# Patient Record
Sex: MALE | Race: OTHER | Employment: FULL TIME | URBAN - METROPOLITAN AREA
[De-identification: names, ages, dates, MRNs, and addresses within clinical notes are randomized per-mention and may not be internally consistent; named-entity substitution may affect disease eponyms.]

---

## 2017-07-26 ENCOUNTER — OFFICE VISIT (OUTPATIENT)
Dept: FAMILY MEDICINE CLINIC | Age: 47
End: 2017-07-26

## 2017-07-26 VITALS
HEART RATE: 61 BPM | HEIGHT: 68 IN | TEMPERATURE: 97.7 F | DIASTOLIC BLOOD PRESSURE: 84 MMHG | RESPIRATION RATE: 20 BRPM | OXYGEN SATURATION: 96 % | WEIGHT: 253 LBS | BODY MASS INDEX: 38.34 KG/M2 | SYSTOLIC BLOOD PRESSURE: 130 MMHG

## 2017-07-26 DIAGNOSIS — M25.512 CHRONIC LEFT SHOULDER PAIN: Primary | ICD-10-CM

## 2017-07-26 DIAGNOSIS — G89.29 CHRONIC LEFT SHOULDER PAIN: Primary | ICD-10-CM

## 2017-07-26 RX ORDER — CYCLOBENZAPRINE HCL 5 MG
5 TABLET ORAL
Qty: 15 TAB | Refills: 0 | OUTPATIENT
Start: 2017-07-26 | End: 2022-05-21

## 2017-07-26 RX ORDER — IBUPROFEN 800 MG/1
800 TABLET ORAL
Qty: 30 TAB | Refills: 0 | Status: SHIPPED | OUTPATIENT
Start: 2017-07-26

## 2017-07-26 NOTE — PATIENT INSTRUCTIONS
MD Mitch Quevedo MD  [address] [place] [address]  For a physician appointment, please call [phone]        Cheri Cerda.  Niyah Mcmanus M.D.  Mami Hebert, [address]  Phone: [phone]  Fax: [phone]

## 2017-07-26 NOTE — PROGRESS NOTES
Subjective:      Paola Christopher is a 41 y.o. male who presents for left shoulder pain. Patient has been seen in the past for this. He has not had physical therapy. He denies any falls to the area. Reviewed imaging from October 2016, imaging did not show any fracture, dislocation,   Patient reports that he picked up a car battery yesterday, felt a strain in his posterior shoulder. Since then his pain has been exacerbated. His shoulder was doing well prior to this. Has been taking some Excedrin he has at home without much improvement. Denies any fevers or chills. Denies any decreased range of motion. Denies any weakness or loss of sensation. ROS:  General/Constitutional:   No headache, fever, fatigue, weight loss or weight gain       Cardiac:    No chest pain      Respiratory:   No cough or shortness of breath     Neurological:   No loss of consciousness, dizziness, seizures,    Skin: No rash   MSK: As above     PMHx:  Past Medical History:   Diagnosis Date    Contact dermatitis and other eczema, due to unspecified cause     Headache        Meds:   Current Outpatient Prescriptions   Medication Sig Dispense Refill    ibuprofen (MOTRIN) 800 mg tablet Take 1 Tab by mouth every eight (8) hours as needed for Pain. 30 Tab 0    cyclobenzaprine (FLEXERIL) 5 mg tablet Take 1 Tab by mouth three (3) times daily as needed for Muscle Spasm(s). 15 Tab 0       Allergies:   No Known Allergies    Smoker:  History   Smoking Status    Never Smoker   Smokeless Tobacco    Never Used       ETOH:   History   Alcohol Use No       FH:   Family History   Problem Relation Age of Onset    Heart Disease Father     Hypertension Father          Objective:     Visit Vitals    /84    Pulse 61    Temp 97.7 °F (36.5 °C) (Oral)    Resp 20    Ht 5' 8\" (1.727 m)    Wt 253 lb (114.8 kg)    SpO2 96%    BMI 38.47 kg/m2       Physical Examination:   GEN: Well appearing. No apparent distress.  Responds to all questions appropriately. Lungs: No labored respirations. Talking in complete sentences without difficulty. Shoulder: left  Deformity: None     ROM:  Forward Flexion: Active: 180                        Passive: 180  ER (0): Active: 45                  Passive: 45  Abduction: Active: 180                        Passive: 180     Palpation:  AC tenderness: None  SC tenderness: None  Clavicle tenderness: None  Biceps tenderness: None     Strength (0-5/5):  Deltoid - Anterior: 5/5  Deltoid - Posterior: 5/5  Deltoid - Mid: 5/5  Supraspinatus: 5/5  External rotation: 5/5  Internal rotation: 5/5        Neuro/Vascular:  Pulses intact, no edema, and neurologically intact      Assessment:       ICD-10-CM ICD-9-CM    1. Chronic left shoulder pain M25.512 719.41 ibuprofen (MOTRIN) 800 mg tablet    G89.29 338.29 cyclobenzaprine (FLEXERIL) 5 mg tablet      REFERRAL TO ORTHOPEDIC SURGERY      REFERRAL TO PHYSICAL THERAPY           Plan:       - Start Motrin 800 mg TID as needed and Flexeril 5mg prn   - Apply ice/heat to area  - Provided list of shoulder exercises   - Referral to Physical Therapy   - Refer to Ortho surgery per patient requests   - Follow up in 2 weeks for annual physical   - Patient is in agreement with the plan       Patient is counseled to return to the office if symptoms do not improve as expected. Urgent consultation with the nearest Emergency Department is strongly recommended if condition worsens. Patient is counseled to follow up as recommended and to inform the office if any changes in treatment are recommended.       Patient discussed  with Dr. Cass Correa        Signed By:  Karen Amanda MD    Family Medicine Resident

## 2017-07-26 NOTE — PROGRESS NOTES
Visit Vitals    /84    Pulse 61    Temp 97.7 °F (36.5 °C) (Oral)    Resp 20    Ht 5' 8\" (1.727 m)    Wt 253 lb (114.8 kg)    SpO2 96%    BMI 38.47 kg/m2     Chief Complaint   Patient presents with    Shoulder Pain     does heavy lifting at work, had pains in left shoulder and back for 2 days, took over the otc helped for a short time and pain came back, feels like  bones is shifting     Back Pain

## 2017-07-26 NOTE — MR AVS SNAPSHOT
Visit Information Date & Time Provider Department Dept. Phone Encounter #  
 7/26/2017 10:10 AM Mery Katz MD [facility] [phone] [id number] Follow-up Instructions Return in about 2 weeks (around 8/9/2017) for annual physical . Your Appointments 8/25/2017  1:00 PM  
COMPLETE PHYSICAL with Mery Katz MD  
[facility]) Appt Note: CPE  
 [facility] [facility]  
[phone]  
  
   
 [address] [place] [address] [facility] [phone] Upcoming Health Maintenance Date Due INFLUENZA AGE 9 TO ADULT 8/1/2017 DTaP/Tdap/Td series (2 - Td) 1/12/2025 Allergies as of 7/26/2017  Review Complete On: 7/26/2017 By: Scot Alves LPN No Known Allergies Current Immunizations  Never Reviewed Name Date Influenza Vaccine 9/26/2013 Influenza Vaccine (Quad) PF 1/4/2016 Influenza Vaccine PF 1/12/2015 Tdap 1/12/2015 Not reviewed this visit You Were Diagnosed With   
  
 Codes Comments Chronic left shoulder pain    -  Primary ICD-10-CM: M25.512, N78.85 ICD-9-CM: 719.41, 338.29 Vitals BP Pulse Temp Resp Height(growth percentile) Weight(growth percentile) 130/84 61 97.7 °F (36.5 °C) (Oral) 20 5' 8\" (1.727 m) 253 lb (114.8 kg) SpO2 BMI Smoking Status 96% 38.47 kg/m2 Never Smoker Vitals History BMI and BSA Data Body Mass Index Body Surface Area  
 38.47 kg/m 2 2.35 m 2 Preferred Pharmacy Pharmacy Name Phone [facility] [address] [place] [address] [phone] Your Updated Medication List  
  
   
This list is accurate as of: 7/26/17 10:13 AM.  Always use your most recent med list.  
  
  
  
  
 citalopram 20 mg tablet Commonly known as:  CELEXA  
TAKE 1 TABLET BY MOUTH DAILY. cyclobenzaprine 5 mg tablet Commonly known as:  FLEXERIL  
 Take 1 Tab by mouth three (3) times daily as needed for Muscle Spasm(s). hydrocortisone 2.5 % rectal cream  
Commonly known as:  ANUSOL-HC Insert  into rectum four (4) times daily as needed for Hemorrhoids. ibuprofen 800 mg tablet Commonly known as:  MOTRIN Take 1 Tab by mouth every eight (8) hours as needed for Pain. predniSONE 20 mg tablet Commonly known as:  Shahbaz Doheny Take 3 tabs PO daily for 3 days, then 2 tabs PO daily for 3 days, then 1 tab PO daily for 3 days, then 1/2 tab PO daily for 4 days. Prescriptions Sent to Pharmacy Refills  
 ibuprofen (MOTRIN) 800 mg tablet 0 Sig: Take 1 Tab by mouth every eight (8) hours as needed for Pain. Class: Normal  
 Pharmacy: [address] #: [phone] Route: Oral  
 cyclobenzaprine (FLEXERIL) 5 mg tablet 0 Sig: Take 1 Tab by mouth three (3) times daily as needed for Muscle Spasm(s). Class: Normal  
 Pharmacy: [address] Ph #: [phone] Route: Oral  
  
We Performed the Following REFERRAL TO ORTHOPEDIC SURGERY [REF62 Custom] Comments:  
 Please evaluate patient for chronic shoulder pain [facility] Follow-up Instructions Return in about 2 weeks (around 8/9/2017) for annual physical . Referral Information Referral ID Referred By Referred To  
  
 [id number] [facility] Delmy MOSQUEDA MD   
   [address] [place] [address] Phone: [phone] Fax: [phone] Visits Status Start Date End Date 1 New Request 7/26/17 7/26/18 If your referral has a status of pending review or denied, additional information will be sent to support the outcome of this decision. Patient Instructions Mark H. Mercer Crosby, MD Charis Boeck, MD 
[address] [place] [address] For a physician appointment, please call [phone] Rob Hall M.D. 
Emma Hebert, [address] Phone: [phone] Fax: [phone] Introducing [facility] & HEALTH SERVICES! [facility] introduces cacaoTV patient portal. Now you can access parts of your medical record, email your doctor's office, and request medication refills online. 1. In your internet browser, go to [email or web address] 2. Click on the First Time User? Click Here link in the Sign In box. You will see the New Member Sign Up page. 3. Enter your cacaoTV Access Code exactly as it appears below. You will not need to use this code after youve completed the sign-up process. If you do not sign up before the expiration date, you must request a new code. · cacaoTV Access Code: [email or web address] Expires: 10/24/2017 10:13 AM 
 
4. Enter the last four digits of your Social Security Number (xxxx) and Date of Birth (mm/dd/yyyy) as indicated and click Submit. You will be taken to the next sign-up page. 5. Create a cacaoTV ID. This will be your cacaoTV login ID and cannot be changed, so think of one that is secure and easy to remember. 6. Create a cacaoTV password. You can change your password at any time. 7. Enter your Password Reset Question and Answer. This can be used at a later time if you forget your password. 8. Enter your e-mail address. You will receive e-mail notification when new information is available in [address] 9. Click Sign Up. You can now view and download portions of your medical record. 10. Click the Download Summary menu link to download a portable copy of your medical information. If you have questions, please visit the Frequently Asked Questions section of the cacaoTV website. Remember, cacaoTV is NOT to be used for urgent needs. For medical emergencies, dial 911. Now available from your iPhone and Android! Please provide this summary of care documentation to your next provider. Your primary care clinician is listed as Oc Grace. If you have any questions after today's visit, please call [phone].

## 2017-08-25 ENCOUNTER — OFFICE VISIT (OUTPATIENT)
Dept: FAMILY MEDICINE CLINIC | Age: 47
End: 2017-08-25

## 2017-08-25 VITALS
DIASTOLIC BLOOD PRESSURE: 90 MMHG | HEIGHT: 68 IN | SYSTOLIC BLOOD PRESSURE: 137 MMHG | BODY MASS INDEX: 38.52 KG/M2 | OXYGEN SATURATION: 99 % | TEMPERATURE: 98.2 F | HEART RATE: 60 BPM | RESPIRATION RATE: 17 BRPM | WEIGHT: 254.2 LBS

## 2017-08-25 DIAGNOSIS — Z00.00 ANNUAL PHYSICAL EXAM: ICD-10-CM

## 2017-08-25 DIAGNOSIS — F32.A DEPRESSION, UNSPECIFIED DEPRESSION TYPE: Primary | ICD-10-CM

## 2017-08-25 RX ORDER — ESCITALOPRAM OXALATE 10 MG/1
10 TABLET ORAL DAILY
Qty: 30 TAB | Refills: 0 | Status: SHIPPED | OUTPATIENT
Start: 2017-08-25

## 2017-08-25 NOTE — PATIENT INSTRUCTIONS
Learning About Depression  What is depression? Depression is an illness that affects how a person feels, thinks, and acts. It's different from normal feelings of sadness or grief. A person who has depression may have less energy. He or she may lose interest in daily activities and may feel sad and grouchy for a long time. Depression is a common illness. It affects men and women of all ages and backgrounds. Many people, and sometimes their families, feel embarrassed or ashamed about having depression. But it isn't a sign of personal weakness. It's not a character flaw. A person who is depressed is not \"crazy. \" Depression is a medical illness. It's caused by changes in the natural chemicals in the brain. Most experts believe that a combination of family history (a person's genes) and stressful life events can cause depression. Health problems may also cause depression or make it worse. It's common for people with long-term (chronic) health problems like coronary artery disease, diabetes, cancer, or chronic pain to feel depressed. It's important to know that depression can be treated. The first step toward feeling better is often just seeing that the problem exists. What are the symptoms? The symptoms of depression may be hard to notice at first. They vary among people, and it's easy to confuse them with just feeling \"off. \" The two most common symptoms are:  · Feeling sad or hopeless nearly every day for at least 2 weeks. · Losing interest in or not getting pleasure from most activities that used to be enjoyable, and feeling this way nearly every day for at least 2 weeks. Other symptoms may appear. A person with depression may, almost every day:  · Eat or sleep more or less than usual.  · Feel tired. · Feel unworthy or guilty. · Find it hard to focus, remember things, or make decisions. A serious symptom of depression is thinking about death or suicide.  If you or someone you care about talks about this or about feeling hopeless, get help right away. How is it treated? Doctors usually treat depression with medicines or counseling. Often a combination of the two works best. Many people don't get help because they think that they'll get over the depression on their own. But some people do not get better without treatment. Antidepressant medicines can improve the symptoms of depression in 1 to 3 weeks. But it can take 6 to 8 weeks to see more improvement. Your doctor will likely have you keep taking these medicines for at least 6 months. In many cases, counseling can work as well as medicines to treat mild to moderate depression. Counseling is done by licensed mental health providers, such as psychologists and social workers. This kind of treatment deals with how you think about things and how you act each day. If depression is caused by a medical problem, treating that problem may also help relieve the depression. What can you do to help someone with depression? If someone you care about is depressed, you may feel helpless. But there are some things you can do. · Help the person get treatment or stay with it. This is the best thing you can do. · Support and encourage the person. · Help the person have good health habits. Urge him or her to get regular exercise, eat a balanced diet, and get enough sleep. · Take care of yourself. Ask others to give you emotional and practical support while you are helping a friend or loved one who has depression. · Keep the numbers for these national suicide hotlines: 8-699-705-TALK (4-546-315-132.523.4828) and 1-880-FMDQVII (9-685.114.8044). If you or someone you know talks about suicide or feeling hopeless, get help right away. Where can you learn more? Go to http://jordan-jessee.info/. Enter S465 in the search box to learn more about \"Learning About Depression. \"  Current as of: February 27, 2017  Content Version: 11.3  © 1634-3956 [employer]. Care instructions adapted under license by [employer] (which disclaims liability or warranty for this information). If you have questions about a medical condition or this instruction, always ask your healthcare professional. [address] any warranty or liability for your use of this information.

## 2017-08-25 NOTE — MR AVS SNAPSHOT
Visit Information Date & Time Provider Department Dept. Phone Encounter #  
 8/25/2017  1:00 PM Mery Katz, [facility] [phone] [id number] Follow-up Instructions Return in about 2 weeks (around 9/8/2017). Upcoming Health Maintenance Date Due INFLUENZA AGE 9 TO ADULT 8/1/2017 DTaP/Tdap/Td series (2 - Td) 1/12/2025 Allergies as of 8/25/2017  Review Complete On: 8/25/2017 By: [place] No Known Allergies Current Immunizations  Never Reviewed Name Date Influenza Vaccine 9/26/2013 Influenza Vaccine (Quad) PF 1/4/2016 Influenza Vaccine PF 1/12/2015 Tdap 1/12/2015 Not reviewed this visit You Were Diagnosed With   
  
 Codes Comments Annual physical exam    -  Primary ICD-10-CM: Z00.00 ICD-9-CM: V70.0 Depression, unspecified depression type     ICD-10-CM: F32.9 ICD-9-CM: 082 Vitals BP Pulse Temp Resp Height(growth percentile) Weight(growth percentile) 137/90 (BP 1 Location: Left arm, BP Patient Position: Sitting) 60 98.2 °F (36.8 °C) (Oral) 17 5' 8\" (1.727 m) 254 lb 3.2 oz (115.3 kg) SpO2 BMI Smoking Status 99% 38.65 kg/m2 Never Smoker Vitals History BMI and BSA Data Body Mass Index Body Surface Area  
 38.65 kg/m 2 2.35 m 2 Preferred Pharmacy [facility] [address] [phone] Your Updated Medication List  
  
   
This list is accurate as of: 8/25/17  1:29 PM.  Always use your most recent med list.  
  
  
  
  
 cyclobenzaprine 5 mg tablet Commonly known as:  FLEXERIL Take 1 Tab by mouth three (3) times daily as needed for Muscle Spasm(s). escitalopram oxalate 10 mg tablet Commonly known as:  Nia Narrow Take 1 Tab by mouth daily. ibuprofen 800 mg tablet Commonly known as:  MOTRIN Take 1 Tab by mouth every eight (8) hours as needed for Pain. Prescriptions Sent to Pharmacy Refills  
 escitalopram oxalate (LEXAPRO) 10 mg tablet 0 Sig: Take 1 Tab by mouth daily. Class: Normal  
 Pharmacy: [facility] [address] [place] [address] #: [phone] Route: Oral  
  
We Performed the Following CBC W/O DIFF [26950 CPT(R)] HEMOGLOBIN A1C WITH EAG [19402 CPT(R)] LIPID PANEL [13189 CPT(R)] METABOLIC PANEL, COMPREHENSIVE [93695 CPT(R)] Follow-up Instructions Return in about 2 weeks (around 9/8/2017). Patient Instructions Learning About Depression What is depression? Depression is an illness that affects how a person feels, thinks, and acts. It's different from normal feelings of sadness or grief. A person who has depression may have less energy. He or she may lose interest in daily activities and may feel sad and grouchy for a long time. Depression is a common illness. It affects men and women of all ages and backgrounds. Many people, and sometimes their families, feel embarrassed or ashamed about having depression. But it isn't a sign of personal weakness. It's not a character flaw. A person who is depressed is not \"crazy. \" Depression is a medical illness. It's caused by changes in the natural chemicals in the brain. Most experts believe that a combination of family history (a person's genes) and stressful life events can cause depression. Health problems may also cause depression or make it worse. It's common for people with long-term (chronic) health problems like coronary artery disease, diabetes, cancer, or chronic pain to feel depressed. It's important to know that depression can be treated. The first step toward feeling better is often just seeing that the problem exists. What are the symptoms? The symptoms of depression may be hard to notice at first. They vary among people, and it's easy to confuse them with just feeling \"off. \" The two most common symptoms are: · Feeling sad or hopeless nearly every day for at least 2 weeks. · Losing interest in or not getting pleasure from most activities that used to be enjoyable, and feeling this way nearly every day for at least 2 weeks. Other symptoms may appear. A person with depression may, almost every day: 
· Eat or sleep more or less than usual. 
· Feel tired. · Feel unworthy or guilty. · Find it hard to focus, remember things, or make decisions. A serious symptom of depression is thinking about death or suicide. If you or someone you care about talks about this or about feeling hopeless, get help right away. How is it treated? Doctors usually treat depression with medicines or counseling. Often a combination of the two works best. Many people don't get help because they think that they'll get over the depression on their own. But some people do not get better without treatment. Antidepressant medicines can improve the symptoms of depression in 1 to 3 weeks. But it can take 6 to 8 weeks to see more improvement. Your doctor will likely have you keep taking these medicines for at least 6 months. In many cases, counseling can work as well as medicines to treat mild to moderate depression. Counseling is done by licensed mental health providers, such as psychologists and social workers. This kind of treatment deals with how you think about things and how you act each day. If depression is caused by a medical problem, treating that problem may also help relieve the depression. What can you do to help someone with depression? If someone you care about is depressed, you may feel helpless. But there are some things you can do. · Help the person get treatment or stay with it. This is the best thing you can do. · Support and encourage the person. · Help the person have good health habits. Urge him or her to get regular exercise, eat a balanced diet, and get enough sleep. · Take care of yourself. Ask others to give you emotional and practical support while you are helping a friend or loved one who has depression. · Keep the numbers for these national suicide hotlines: 8-230-911-TALK (2-276.886.5077) and 9-700-SOBIFRV (1-855.462.3150). If you or someone you know talks about suicide or feeling hopeless, get help right away. Where can you learn more? Go to http://jordan-jessee.info/. Enter R062 in the search box to learn more about \"Learning About Depression. \" Current as of: February 27, 2017 Content Version: 11.3 © 5360-3580 [employer]. Care instructions adapted under license by [employer] (which disclaims liability or warranty for this information). If you have questions about a medical condition or this instruction, always ask your healthcare professional. [address] any warranty or liability for your use of this information. Introducing [facility] & HEALTH SERVICES! Roland Strickland introduces Overtone patient portal. Now you can access parts of your medical record, email your doctor's office, and request medication refills online. 1. In your internet browser, go to [email or web address] 2. Click on the First Time User? Click Here link in the Sign In box. You will see the New Member Sign Up page. 3. Enter your Overtone Access Code exactly as it appears below. You will not need to use this code after youve completed the sign-up process. If you do not sign up before the expiration date, you must request a new code. · Overtone Access Code: [email or web address] Expires: 10/24/2017 10:13 AM 
 
4. Enter the last four digits of your Social Security Number (xxxx) and Date of Birth (mm/dd/yyyy) as indicated and click Submit. You will be taken to the next sign-up page. 5. Create a Overtone ID. This will be your Overtone login ID and cannot be changed, so think of one that is secure and easy to remember. 6. Create a [employer] password. You can change your password at any time. 7. Enter your Password Reset Question and Answer. This can be used at a later time if you forget your password. 8. Enter your e-mail address. You will receive e-mail notification when new information is available in [address] 9. Click Sign Up. You can now view and download portions of your medical record. 10. Click the Download Summary menu link to download a portable copy of your medical information. If you have questions, please visit the Frequently Asked Questions section of the [employer]INGS website. Remember, [employer]INGS is NOT to be used for urgent needs. For medical emergencies, dial 911. Now available from your iPhone and Android! Please provide this summary of care documentation to your next provider. Your primary care clinician is listed as George Borjas. If you have any questions after today's visit, please call [phone].

## 2017-08-25 NOTE — PROGRESS NOTES
Chief Complaint   Patient presents with    Complete Physical     1. Have you been to the ER, urgent care clinic since your last visit? Hospitalized since your last visit? No    2. Have you seen or consulted any other health care providers outside of the [address] [place] [address] since your last visit? Include any pap smears or colon screening.  No

## 2017-08-25 NOTE — PROGRESS NOTES
HISTORY:     Bobbi Fermin is a 41 y.o. male who presents to clinic today for annual physical exam.    He would also like to address the following issues:  His mood has been down and has had little pleasure in doing things over the past few weeks. Patient reports trouble falling asleep and has little energy. He also has trouble concentrating and moves slowly. He denies any suicidal or homicidal ideations. Sexually active?: Yes with wife. Diet: Does not follow a regular diet. Exercise: No regular exercise regimen. Past Medical History:   Diagnosis Date    Contact dermatitis and other eczema, due to unspecified cause     Headache        Current Outpatient Prescriptions on File Prior to Visit   Medication Sig Dispense Refill    ibuprofen (MOTRIN) 800 mg tablet Take 1 Tab by mouth every eight (8) hours as needed for Pain. 30 Tab 0    cyclobenzaprine (FLEXERIL) 5 mg tablet Take 1 Tab by mouth three (3) times daily as needed for Muscle Spasm(s). 15 Tab 0     No current facility-administered medications on file prior to visit. Past Surgical History:   Procedure Laterality Date    ESWL FOR GALLSTONES      HX LAP GASTRIC BYPASS  March 2014    [facility]. Sleeve gastrectomy       Social Hx:    Smoker? No   Alcohol Use? No       Drug Use? No   Occupation?    Concern for STDs? No         Family History   Problem Relation Age of Onset    Heart Disease Father     Hypertension Father        No Known Allergies      PHYSICAL EXAM   BP Readings from Last 3 Encounters:   08/25/17 137/90   07/26/17 130/84   10/21/16 123/80       Visit Vitals    /90 (BP 1 Location: Left arm, BP Patient Position: Sitting)    Pulse 60    Temp 98.2 °F (36.8 °C) (Oral)    Resp 17    Ht 5' 8\" (1.727 m)    Wt 254 lb 3.2 oz (115.3 kg)    SpO2 99%    BMI 38.65 kg/m2       Body mass index is 38.65 kg/(m^2). Physical Exam   Constitutional: He is oriented to person, place, and time.  He appears well-developed and well-nourished. HENT:   Head: Normocephalic and atraumatic. Right Ear: External ear normal.   Left Ear: External ear normal.   Eyes: Conjunctivae and EOM are normal.   Neck: Normal range of motion. Neck supple. Cardiovascular: Normal rate and regular rhythm. Exam reveals no gallop and no friction rub. No murmur heard. Pulmonary/Chest: Effort normal and breath sounds normal. No respiratory distress. He has no wheezes. Abdominal: Soft. Bowel sounds are normal. He exhibits no distension. There is no tenderness. Musculoskeletal: Normal range of motion. He exhibits no edema or deformity. Neurological: He is alert and oriented to person, place, and time. Psychiatric: He has a normal mood and affect. His behavior is normal. Thought content normal.         A/P   Dru Escobar is a 41 y.o. male presenting to clinic today for routine annual exam.      ICD-10-CM ICD-9-CM    1. Depression, unspecified depression type F32.9 311 escitalopram oxalate (LEXAPRO) 10 mg tablet   2.  Annual physical exam Z00.00 V70.0 CBC W/O DIFF      METABOLIC PANEL, COMPREHENSIVE      LIPID PANEL      HEMOGLOBIN A1C WITH EAG     PHQ9 - 18   - Start on Lexapro 10 mg daily, counseled on side effects of medication   - Declined referral for cognitive therapy today, would like trial of medication first   - ED precautions givens  - f/u labs including CBC, CMP and lipid panel   - Counseled re: diet, exercise, healthy lifestyle  - Return for yearly wellness visits  - Follow up in 2 weeks       Signed By:  Stacy Leigh MD    Family Medicine Resident

## 2017-08-26 LAB
ALBUMIN SERPL-MCNC: 4.2 G/DL (ref 3.5–5.5)
ALBUMIN/GLOB SERPL: 1.4 {RATIO} (ref 1.2–2.2)
ALP SERPL-CCNC: 48 IU/L (ref 39–117)
ALT SERPL-CCNC: 25 IU/L (ref 0–44)
AST SERPL-CCNC: 17 IU/L (ref 0–40)
BILIRUB SERPL-MCNC: 0.3 MG/DL (ref 0–1.2)
BUN SERPL-MCNC: 13 MG/DL (ref 6–24)
BUN/CREAT SERPL: 13 (ref 9–20)
CALCIUM SERPL-MCNC: 9.5 MG/DL (ref 8.7–10.2)
CHLORIDE SERPL-SCNC: 100 MMOL/L (ref 96–106)
CHOLEST SERPL-MCNC: 272 MG/DL (ref 100–199)
CO2 SERPL-SCNC: 25 MMOL/L (ref 18–29)
CREAT SERPL-MCNC: 0.97 MG/DL (ref 0.76–1.27)
ERYTHROCYTE [DISTWIDTH] IN BLOOD BY AUTOMATED COUNT: 14.8 % (ref 12.3–15.4)
EST. AVERAGE GLUCOSE BLD GHB EST-MCNC: 120 MG/DL
GLOBULIN SER CALC-MCNC: 2.9 G/DL (ref 1.5–4.5)
GLUCOSE SERPL-MCNC: 90 MG/DL (ref 65–99)
HBA1C MFR BLD: 5.8 % (ref 4.8–5.6)
HCT VFR BLD AUTO: 43.5 % (ref 37.5–51)
HDLC SERPL-MCNC: 45 MG/DL
HGB BLD-MCNC: 14.4 G/DL (ref 12.6–17.7)
INTERPRETATION, 910389: NORMAL
LDLC SERPL CALC-MCNC: 173 MG/DL (ref 0–99)
MCH RBC QN AUTO: 29.2 PG (ref 26.6–33)
MCHC RBC AUTO-ENTMCNC: 33.1 G/DL (ref 31.5–35.7)
MCV RBC AUTO: 88 FL (ref 79–97)
PLATELET # BLD AUTO: 226 X10E3/UL (ref 150–379)
POTASSIUM SERPL-SCNC: 4.2 MMOL/L (ref 3.5–5.2)
PROT SERPL-MCNC: 7.1 G/DL (ref 6–8.5)
RBC # BLD AUTO: 4.93 X10E6/UL (ref 4.14–5.8)
SODIUM SERPL-SCNC: 140 MMOL/L (ref 134–144)
TRIGL SERPL-MCNC: 271 MG/DL (ref 0–149)
VLDLC SERPL CALC-MCNC: 54 MG/DL (ref 5–40)
WBC # BLD AUTO: 9 X10E3/UL (ref 3.4–10.8)

## 2017-09-05 DIAGNOSIS — F32.A DEPRESSION, UNSPECIFIED DEPRESSION TYPE: ICD-10-CM

## 2017-09-05 RX ORDER — ESCITALOPRAM OXALATE 10 MG/1
10 TABLET ORAL DAILY
Qty: 30 TAB | Refills: 0 | OUTPATIENT
Start: 2017-09-05

## 2017-09-05 NOTE — TELEPHONE ENCOUNTER
----- Message from Kristin Vegas sent at 9/1/2017  5:23 PM EDT -----  Regarding: Dr. Ioana Mccall  Pt wife is requesting for the pt depression Rx to be filled at [facility]. [phone], ph [phone]. Pt wife is not sure of the name of the Rx however she stated its on the pt file. The pt best contact number is [phone].

## 2017-10-06 DIAGNOSIS — F32.A DEPRESSION, UNSPECIFIED DEPRESSION TYPE: ICD-10-CM

## 2017-10-06 RX ORDER — ESCITALOPRAM OXALATE 10 MG/1
10 TABLET ORAL DAILY
Qty: 30 TAB | Refills: 0 | OUTPATIENT
Start: 2017-10-06

## 2017-10-06 NOTE — TELEPHONE ENCOUNTER
Patient needs a refill of the following  Requested Prescriptions     Pending Prescriptions Disp Refills    escitalopram oxalate (LEXAPRO) 10 mg tablet 30 Tab 0     Sig: Take 1 Tab by mouth daily.

## 2018-05-15 ENCOUNTER — OFFICE VISIT (OUTPATIENT)
Dept: FAMILY MEDICINE CLINIC | Age: 48
End: 2018-05-15

## 2018-05-15 VITALS
RESPIRATION RATE: 16 BRPM | TEMPERATURE: 98.4 F | BODY MASS INDEX: 40.38 KG/M2 | HEIGHT: 68 IN | SYSTOLIC BLOOD PRESSURE: 135 MMHG | DIASTOLIC BLOOD PRESSURE: 86 MMHG | OXYGEN SATURATION: 95 % | WEIGHT: 266.4 LBS | HEART RATE: 73 BPM

## 2018-05-15 DIAGNOSIS — F33.9 RECURRENT MAJOR DEPRESSIVE DISORDER, REMISSION STATUS UNSPECIFIED (HCC): ICD-10-CM

## 2018-05-15 DIAGNOSIS — D17.79 LIPOMA OF OTHER SPECIFIED SITES: ICD-10-CM

## 2018-05-15 DIAGNOSIS — L03.90 CELLULITIS, UNSPECIFIED CELLULITIS SITE: Primary | ICD-10-CM

## 2018-05-15 RX ORDER — DOXYCYCLINE 100 MG/1
100 CAPSULE ORAL 2 TIMES DAILY
Qty: 28 CAP | Refills: 0 | Status: SHIPPED | OUTPATIENT
Start: 2018-05-15 | End: 2018-05-29

## 2018-05-15 NOTE — PATIENT INSTRUCTIONS
Skin Abscess: Care Instructions  Your Care Instructions    A skin abscess is a bacterial infection that forms a pocket of pus. A boil is a kind of skin abscess. The doctor may have cut an opening in the abscess so that the pus can drain out. You may have gauze in the cut so that the abscess will stay open and keep draining. You may need antibiotics. You will need to follow up with your doctor to make sure the infection has gone away. The doctor has checked you carefully, but problems can develop later. If you notice any problems or new symptoms, get medical treatment right away. Follow-up care is a key part of your treatment and safety. Be sure to make and go to all appointments, and call your doctor if you are having problems. It's also a good idea to know your test results and keep a list of the medicines you take. How can you care for yourself at home? · Apply warm and dry compresses, a heating pad set on low, or a hot water bottle 3 or 4 times a day for pain. Keep a cloth between the heat source and your skin. · If your doctor prescribed antibiotics, take them as directed. Do not stop taking them just because you feel better. You need to take the full course of antibiotics. · Take pain medicines exactly as directed. ¨ If the doctor gave you a prescription medicine for pain, take it as prescribed. ¨ If you are not taking a prescription pain medicine, ask your doctor if you can take an over-the-counter medicine. · Keep your bandage clean and dry. Change the bandage whenever it gets wet or dirty, or at least one time a day. · If the abscess was packed with gauze:  ¨ Keep follow-up appointments to have the gauze changed or removed. If the doctor instructed you to remove the gauze, gently pull out all of the gauze when your doctor tells you to. ¨ After the gauze is removed, soak the area in warm water for 15 to 20 minutes 2 times a day, until the wound closes. When should you call for help?   Call your doctor now or seek immediate medical care if:  ? · You have signs of worsening infection, such as:  ¨ Increased pain, swelling, warmth, or redness. ¨ Red streaks leading from the infected skin. ¨ Pus draining from the wound. ¨ A fever. ? Watch closely for changes in your health, and be sure to contact your doctor if:  ? · You do not get better as expected. Where can you learn more? Go to http://jordan-jessee.info/. Enter H344 in the search box to learn more about \"Skin Abscess: Care Instructions. \"  Current as of: October 13, 2016  Content Version: 11.4  © 7032-4652 [employer]. Care instructions adapted under license by [employer] (which disclaims liability or warranty for this information). If you have questions about a medical condition or this instruction, always ask your healthcare professional. [place] [address] any warranty or liability for your use of this information. Fatigue: Care Instructions  Your Care Instructions    Fatigue is a feeling of tiredness, exhaustion, or lack of energy. You may feel fatigue because of too much or not enough activity. It can also come from stress, lack of sleep, boredom, and poor diet. Many medical problems, such as viral infections, can cause fatigue. Emotional problems, especially depression, are often the cause of fatigue. Fatigue is most often a symptom of another problem. Treatment for fatigue depends on the cause. For example, if you have fatigue because you have a certain health problem, treating this problem also treats your fatigue. If depression or anxiety is the cause, treatment may help. Follow-up care is a key part of your treatment and safety. Be sure to make and go to all appointments, and call your doctor if you are having problems. It's also a good idea to know your test results and keep a list of the medicines you take. How can you care for yourself at home?   · Get regular exercise. But don't overdo it. Go back and forth between rest and exercise. · Get plenty of rest.  · Eat a healthy diet. Do not skip meals, especially breakfast.  · Reduce your use of caffeine, tobacco, and alcohol. Caffeine is most often found in coffee, tea, cola drinks, and chocolate. · Limit medicines that can cause fatigue. This includes tranquilizers and cold and allergy medicines. When should you call for help? Watch closely for changes in your health, and be sure to contact your doctor if:  ? · You have new symptoms such as fever or a rash. ? · Your fatigue gets worse. ? · You have been feeling down, depressed, or hopeless. Or you may have lost interest in things that you usually enjoy. ? · You are not getting better as expected. Where can you learn more? Go to http://jordanBarracuda Networksjessee.info/. Enter O137 in the search box to learn more about \"Fatigue: Care Instructions. \"  Current as of: March 20, 2017  Content Version: 11.4  © 7798-1333 [employer]. Care instructions adapted under license by [employer] (which disclaims liability or warranty for this information). If you have questions about a medical condition or this instruction, always ask your healthcare professional. [address] any warranty or liability for your use of this information. Lipoma: Care Instructions  Your Care Instructions  A lipoma is a growth of fat just below the skin. It may feel soft and rubbery. Lipomas can occur anywhere on the body. But they are most common on the torso, neck, upper thighs, upper arms, and armpits. A lipoma does not turn into cancer. Lipomas usually are not treated, because most of them don't hurt or cause problems. But your doctor may remove a lipoma if it is painful, gets infected, or bothers you. Follow-up care is a key part of your treatment and safety.  Be sure to make and go to all appointments, and call your doctor if you are having problems. It's also a good idea to know your test results and keep a list of the medicines you take. How can you care for yourself at home? · Lipomas usually need no care at home. · If your doctor removes a lipoma, follow directions for taking care of the cut (incision). When should you call for help? Call your doctor now or seek immediate medical care if:  ? · You have signs of infection, such as:  ¨ Increased pain, swelling, warmth, or redness. ¨ Red streaks leading from the lipoma. ¨ Pus draining from the lipoma. ¨ A fever. ? Watch closely for changes in your health, and be sure to contact your doctor if:  ? · The lipoma is growing or changing. ? · You do not get better as expected. Where can you learn more? Go to http://jordan-jessee.info/. Enter A306 in the search box to learn more about \"Lipoma: Care Instructions. \"  Current as of: October 13, 2016  Content Version: 11.4  © 4319-0819 [employer]. Care instructions adapted under license by [employer] (which disclaims liability or warranty for this information). If you have questions about a medical condition or this instruction, always ask your healthcare professional. [address] any warranty or liability for your use of this information.

## 2018-05-15 NOTE — PROGRESS NOTES
Chief Complaint   Patient presents with    Fatigue     always been tired from past two months, and pt states there is a cyst in the upper back, dosent hurt but only when does physical work.

## 2018-05-15 NOTE — PROGRESS NOTES
Subjective  Santa Shook is an 57 y.o. male who presents to evaluate new skin change after tick was removed, lump on his back, and depression. Pt states that 5 days ago a tick was noted attached to the anterior right armpit. Pt states that whole tick was removed. However the area became red and warm. Denies fever or chills. No treatment attempted. Pt also notes he has mass on he back. Left under the scapula. No recent injury. Mass is not tender. Mass is mobile. Not effecting the ROM of scapula. No other similar masses any where else on his body. Dx with depression on 8/2017. Pt was to start Lexapro. However pt states he never started to take medication as he was told the medication would lead to suicide. Pt states he has episodes of tearful. Wife shares that pt is sad at times. Pt eats as he is depressed. No issues with sleep (no snoring, never noted to stop breathing while asleep). No hx or current SI. Diet not controlled. Fast food and soda  No exercise     Allergies - reviewed:   No Known Allergies      Medications - reviewed:   Current Outpatient Prescriptions   Medication Sig    doxycycline (VIBRAMYCIN) 100 mg capsule Take 1 Cap by mouth two (2) times a day for 14 days.  escitalopram oxalate (LEXAPRO) 10 mg tablet Take 1 Tab by mouth daily.  ibuprofen (MOTRIN) 800 mg tablet Take 1 Tab by mouth every eight (8) hours as needed for Pain.  cyclobenzaprine (FLEXERIL) 5 mg tablet Take 1 Tab by mouth three (3) times daily as needed for Muscle Spasm(s). No current facility-administered medications for this visit.           Past Medical History - reviewed:  Past Medical History:   Diagnosis Date    Contact dermatitis and other eczema, due to unspecified cause     Headache          Past Surgical History - reviewed:   Past Surgical History:   Procedure Laterality Date    ESWL FOR GALLSTONES      HX LAP GASTRIC BYPASS  March 2014    [facility]. Sleeve gastrectomy         Social History - reviewed:  Social History     Social History    Marital status: UNKNOWN     Spouse name: N/A    Number of children: N/A    Years of education: N/A     Occupational History    Not on file. Social History Main Topics    Smoking status: Never Smoker    Smokeless tobacco: Never Used    Alcohol use No    Drug use: No    Sexual activity: Yes     Partners: Female     Birth control/ protection: None     Other Topics Concern    Not on file     Social History Narrative         Family History - reviewed:  Family History   Problem Relation Age of Onset    Heart Disease Father     Hypertension Father          Immunizations - reviewed:   Immunization History   Administered Date(s) Administered    Influenza Vaccine 09/26/2013    Influenza Vaccine (Quad) PF 01/04/2016    Influenza Vaccine PF 01/12/2015    Tdap 01/12/2015       ROS: Negative except for BOLD  General: fever, chills, fatigue  Respiratory: cough, SOB, wheezing  Cardiovascular:  CP, palpitation, SNOW, edema   Gastrointestinal: N/V/D, bleeding  Genito-Urinary: dysuria, hematuria  Musculoskeletal: muscle weakness, pain, swelling      Physical Exam  Visit Vitals    /86 (BP 1 Location: Right arm, BP Patient Position: Sitting)    Pulse 73    Temp 98.4 °F (36.9 °C) (Oral)    Resp 16    Ht 5' 8\" (1.727 m)    Wt 266 lb 6.4 oz (120.8 kg)    SpO2 95%    BMI 40.51 kg/m2       GEN: The patient appears well, alert, oriented x 3, in no distress. Morbidly obese  Lungs: clear bilaterally, good air entry, no wheezes, rhonchi or rales. Cardiovascular: regular rate and rhythm. S1 and S2 normal, no murmurs,  Abdomen: + BS, soft without tenderness, guarding, rebound, mass or organomegaly. Back:  Along the left side just below the scapula, there is an approximately 12 x 9 cm firm, rubbery, mobile, non-tender, well-circumscribed soft tissue mass. Extremities: no edema, normal peripheral pulses.    Neurological: normal, gross sensory and motor in tact without focal findings. Skin: skin of the anterior axillary region is erythematous, mild tender, and warm. No fluid collection noted. No signs of bite or lesions. No discharge noted. PHQ9 score 10    Assessment/Plan    ICD-10-CM ICD-9-CM    1. Cellulitis, unspecified cellulitis site L03.90 682.9 doxycycline (VIBRAMYCIN) 100 mg capsule   2. Lipoma of other specified sites D17.79 214.8 REFERRAL TO GENERAL SURGERY   3. Recurrent major depressive disorder, remission status unspecified ([facility]) F33.9 296.30 [facility] PATIENT SCREENED FOR DEPRESSION   4. BMI 40.0-44.9, adult ([facility] [address] Z68.41 V85.41      Will start abx for cellulitis. Doxy would cover tick borne infection as well. Warm compress. Precautions given of when to seek medical attention. Pt understands and agrees plan  Will refer to gen surgery for potential excision of lipoma. Pt never started lexapro. Depression therefore remains not controlled. No SI or HI. Pt to start lexapro. Pt aware can take 2-3 weeks for medication to have full effect. I have reviewed/discussed the above normal BMI with the patient. I have recommended the following interventions: dietary management education, guidance, and counseling, encourage exercise and monitor weight . Torrey Rivera Follow-up Disposition:  Return in about 2 weeks (around 5/29/2018), or if symptoms worsen or fail to improve. Will re-evaluate cellulitis and depression      I have discussed the diagnosis with the patient and the intended plan as seen in the above orders. Patient verbalized understanding of the plan and agrees with the plan. The patient has received an after-visit summary and questions were answered concerning future plans. I have discussed medication side effects and warnings with the patient as well. Informed patient to return to the office if new symptoms arise.         Richard Garnica DO  Family Medicine Resident

## 2018-05-15 NOTE — MR AVS SNAPSHOT
[address] [phone] Patient: Krystyna Harkins MRN: [id number] OFT:5/73/1843 Visit Information Date & Time Provider Department Dept. Phone Encounter #  
 5/15/2018  6:15 PM Nelly Daugherty, [address] [phone] [id number] Upcoming Health Maintenance Date Due Influenza Age 5 to Adult 8/1/2018 DTaP/Tdap/Td series (2 - Td) 1/12/2025 Allergies as of 5/15/2018  Review Complete On: 5/15/2018 By: Marisol Ho No Known Allergies Current Immunizations  Never Reviewed Name Date Influenza Vaccine 9/26/2013 Influenza Vaccine (Quad) PF 1/4/2016 Influenza Vaccine PF 1/12/2015 Tdap 1/12/2015 Not reviewed this visit You Were Diagnosed With   
  
 Codes Comments Cellulitis, unspecified cellulitis site    -  Primary ICD-10-CM: L03.90 ICD-9-CM: 682.9 Lipoma of other specified sites     ICD-10-CM: D17.79 ICD-9-CM: 214.8 Vitals BP Pulse Temp Resp Height(growth percentile) Weight(growth percentile) 135/86 (BP 1 Location: Right arm, BP Patient Position: Sitting) 73 98.4 °F (36.9 °C) (Oral) 16 5' 8\" (1.727 m) 266 lb 6.4 oz (120.8 kg) SpO2 BMI Smoking Status 95% 40.51 kg/m2 Never Smoker Vitals History BMI and BSA Data Body Mass Index Body Surface Area 40.51 kg/m 2 2.41 m 2 Preferred Pharmacy Pharmacy Name Phone [address] [facility] AT [address] [place] [address]. [phone] Your Updated Medication List  
  
   
This list is accurate as of 5/15/18  6:57 PM.  Always use your most recent med list.  
  
  
  
  
 cyclobenzaprine 5 mg tablet Commonly known as:  FLEXERIL Take 1 Tab by mouth three (3) times daily as needed for Muscle Spasm(s). doxycycline 100 mg capsule Commonly known as:  VIBRAMYCIN  
 Take 1 Cap by mouth two (2) times a day for 14 days. escitalopram oxalate 10 mg tablet Commonly known as:  Celesta Prost Take 1 Tab by mouth daily. ibuprofen 800 mg tablet Commonly known as:  MOTRIN Take 1 Tab by mouth every eight (8) hours as needed for Pain. Prescriptions Sent to Pharmacy Refills  
 doxycycline (VIBRAMYCIN) 100 mg capsule 0 Sig: Take 1 Cap by mouth two (2) times a day for 14 days. Class: Normal  
 Pharmacy: [facility] [address] [place] [address] AT [facility].  #: [phone] Route: Oral  
  
We Performed the Following REFERRAL TO GENERAL SURGERY [REF27 Custom] Referral Information Referral ID Referred By Referred To  
  
 [id number] Missy Garza MD   
   [address] Phone: [phone] Fax: [phone] Visits Status Start Date End Date 1 New Request 5/15/18 5/15/19 If your referral has a status of pending review or denied, additional information will be sent to support the outcome of this decision. Patient Instructions Skin Abscess: Care Instructions Your Care Instructions A skin abscess is a bacterial infection that forms a pocket of pus. A boil is a kind of skin abscess. The doctor may have cut an opening in the abscess so that the pus can drain out. You may have gauze in the cut so that the abscess will stay open and keep draining. You may need antibiotics. You will need to follow up with your doctor to make sure the infection has gone away. The doctor has checked you carefully, but problems can develop later. If you notice any problems or new symptoms, get medical treatment right away. Follow-up care is a key part of your treatment and safety.  Be sure to make and go to all appointments, and call your doctor if you are having problems. It's also a good idea to know your test results and keep a list of the medicines you take. How can you care for yourself at home? · Apply warm and dry compresses, a heating pad set on low, or a hot water bottle 3 or 4 times a day for pain. Keep a cloth between the heat source and your skin. · If your doctor prescribed antibiotics, take them as directed. Do not stop taking them just because you feel better. You need to take the full course of antibiotics. · Take pain medicines exactly as directed. ¨ If the doctor gave you a prescription medicine for pain, take it as prescribed. ¨ If you are not taking a prescription pain medicine, ask your doctor if you can take an over-the-counter medicine. · Keep your bandage clean and dry. Change the bandage whenever it gets wet or dirty, or at least one time a day. · If the abscess was packed with gauze: 
¨ Keep follow-up appointments to have the gauze changed or removed. If the doctor instructed you to remove the gauze, gently pull out all of the gauze when your doctor tells you to. ¨ After the gauze is removed, soak the area in warm water for 15 to 20 minutes 2 times a day, until the wound closes. When should you call for help? Call your doctor now or seek immediate medical care if: 
? · You have signs of worsening infection, such as: 
¨ Increased pain, swelling, warmth, or redness. ¨ Red streaks leading from the infected skin. ¨ Pus draining from the wound. ¨ A fever. ? Watch closely for changes in your health, and be sure to contact your doctor if: 
? · You do not get better as expected. Where can you learn more? Go to http://jordan-jessee.info/. Enter T877 in the search box to learn more about \"Skin Abscess: Care Instructions. \" Current as of: October 13, 2016 Content Version: 11.4 © 1728-6972 [employer].  Care instructions adapted under license by [employer] (which disclaims liability or warranty for this information). If you have questions about a medical condition or this instruction, always ask your healthcare professional. [address] any warranty or liability for your use of this information. Fatigue: Care Instructions Your Care Instructions Fatigue is a feeling of tiredness, exhaustion, or lack of energy. You may feel fatigue because of too much or not enough activity. It can also come from stress, lack of sleep, boredom, and poor diet. Many medical problems, such as viral infections, can cause fatigue. Emotional problems, especially depression, are often the cause of fatigue. Fatigue is most often a symptom of another problem. Treatment for fatigue depends on the cause. For example, if you have fatigue because you have a certain health problem, treating this problem also treats your fatigue. If depression or anxiety is the cause, treatment may help. Follow-up care is a key part of your treatment and safety. Be sure to make and go to all appointments, and call your doctor if you are having problems. It's also a good idea to know your test results and keep a list of the medicines you take. How can you care for yourself at home? · Get regular exercise. But don't overdo it. Go back and forth between rest and exercise. · Get plenty of rest. 
· Eat a healthy diet. Do not skip meals, especially breakfast. 
· Reduce your use of caffeine, tobacco, and alcohol. Caffeine is most often found in coffee, tea, cola drinks, and chocolate. · Limit medicines that can cause fatigue. This includes tranquilizers and cold and allergy medicines. When should you call for help? Watch closely for changes in your health, and be sure to contact your doctor if: 
? · You have new symptoms such as fever or a rash. ? · Your fatigue gets worse. ? · You have been feeling down, depressed, or hopeless. Or you may have lost interest in things that you usually enjoy. ? · You are not getting better as expected. Where can you learn more? Go to http://jordan-jessee.info/. Enter P179 in the search box to learn more about \"Fatigue: Care Instructions. \" Current as of: March 20, 2017 Content Version: 11.4 © 0659-5137 [employer]. Care instructions adapted under license by Visitar (which disclaims liability or warranty for this information). If you have questions about a medical condition or this instruction, always ask your healthcare professional. [address] any warranty or liability for your use of this information. Lipoma: Care Instructions Your Care Instructions A lipoma is a growth of fat just below the skin. It may feel soft and rubbery. Lipomas can occur anywhere on the body. But they are most common on the torso, neck, upper thighs, upper arms, and armpits. A lipoma does not turn into cancer. Lipomas usually are not treated, because most of them don't hurt or cause problems. But your doctor may remove a lipoma if it is painful, gets infected, or bothers you. Follow-up care is a key part of your treatment and safety. Be sure to make and go to all appointments, and call your doctor if you are having problems. It's also a good idea to know your test results and keep a list of the medicines you take. How can you care for yourself at home? · Lipomas usually need no care at home. · If your doctor removes a lipoma, follow directions for taking care of the cut (incision). When should you call for help? Call your doctor now or seek immediate medical care if: 
? · You have signs of infection, such as: 
¨ Increased pain, swelling, warmth, or redness. ¨ Red streaks leading from the lipoma. ¨ Pus draining from the lipoma. ¨ A fever. ? Watch closely for changes in your health, and be sure to contact your doctor if: 
? · The lipoma is growing or changing. ? · You do not get better as expected. Where can you learn more? Go to http://jordan-jessee.info/. Enter S194 in the search box to learn more about \"Lipoma: Care Instructions. \" Current as of: October 13, 2016 Content Version: 11.4 © 4605-7585 [employer]. Care instructions adapted under license by [employer] (which disclaims liability or warranty for this information). If you have questions about a medical condition or this instruction, always ask your healthcare professional. [address] any warranty or liability for your use of this information. Introducing \[facility]\"" & HEALTH SERVICES! Chad Muñoz introduces DeYapa patient portal. Now you can access parts of your medical record, email your doctor's office, and request medication refills online. 1. In your internet browser, go to https://"[employer]. [employer]/"[employer] 2. Click on the First Time User? Click Here link in the Sign In box. You will see the New Member Sign Up page. 3. Enter your DeYapa Access Code exactly as it appears below. You will not need to use this code after youve completed the sign-up process. If you do not sign up before the expiration date, you must request a new code. · DeYapa Access Code: [email or web address] Expires: 8/13/2018  6:24 PM 
 
4. Enter the last four digits of your Social Security Number (xxxx) and Date of Birth (mm/dd/yyyy) as indicated and click Submit. You will be taken to the next sign-up page. 5. Create a [employer] ID. This will be your DeYapa login ID and cannot be changed, so think of one that is secure and easy to remember. 6. Create a DeYapa password. You can change your password at any time. 7. Enter your Password Reset Question and Answer. This can be used at a later time if you forget your password. 8. Enter your e-mail address. You will receive e-mail notification when new information is available in [address] 9. Click Sign Up. You can now view and download portions of your medical record. 10. Click the Download Summary menu link to download a portable copy of your medical information. If you have questions, please visit the Frequently Asked Questions section of the [employer] website. Remember, [employer] is NOT to be used for urgent needs. For medical emergencies, dial 911. Now available from your iPhone and Android! Please provide this summary of care documentation to your next provider. Your primary care clinician is listed as Roseline Pimentel. If you have any questions after today's visit, please call [phone].

## 2018-10-24 ENCOUNTER — OFFICE VISIT (OUTPATIENT)
Dept: FAMILY MEDICINE CLINIC | Age: 48
End: 2018-10-24

## 2018-10-24 VITALS
SYSTOLIC BLOOD PRESSURE: 128 MMHG | WEIGHT: 262 LBS | TEMPERATURE: 98.1 F | OXYGEN SATURATION: 97 % | HEART RATE: 68 BPM | DIASTOLIC BLOOD PRESSURE: 71 MMHG | BODY MASS INDEX: 39.71 KG/M2 | HEIGHT: 68 IN | RESPIRATION RATE: 17 BRPM

## 2018-10-24 DIAGNOSIS — B80 PINWORMS: Primary | ICD-10-CM

## 2018-10-24 NOTE — PROGRESS NOTES
[facility]     Outpatient Resident Progress Note    Encounter Date: 10/24/2018    Chief Complaint   Patient presents with    Anal Itching     x 4 days    Arm Pain     Left arm pain x 3 weeks        History of Present Illness    Patient is a 57 y.o. male, who presents to clinic for Anal Itching (x 4 days) and Arm Pain (Left arm pain x 3 weeks )  Patient feared that he might have had a pin-worms as his daughter have Hx. He denies any trauma to the anal region, bleeding, pain, tenderness, lesions, groin area symptoms, diarrhea, constipation, or any other complains. He used OTC \"Sunil's Pinworm Medicine\" which contains Pyrantel Pamoate on a single dose formulation with complete resolution of symptoms 3 days ago. Review of Systems    A complete ROS was reviewed and only pertinent items documented on HPI. Allergies - reviewed:   No Known Allergies    Medications - reviewed:   Current Outpatient Medications   Medication Sig    escitalopram oxalate (LEXAPRO) 10 mg tablet Take 1 Tab by mouth daily.  ibuprofen (MOTRIN) 800 mg tablet Take 1 Tab by mouth every eight (8) hours as needed for Pain.  cyclobenzaprine (FLEXERIL) 5 mg tablet Take 1 Tab by mouth three (3) times daily as needed for Muscle Spasm(s). No current facility-administered medications for this visit. Past Medical History - reviewed:  Past Medical History:   Diagnosis Date    Contact dermatitis and other eczema, due to unspecified cause     Headache(784.0)        Family Medical History - reviewed:  Family History   Problem Relation Age of Onset    Heart Disease Father     Hypertension Father        Objective  Visit Vitals  /71   Pulse 68   Temp 98.1 °F (36.7 °C) (Oral)   Resp 17   Ht 5' 8\" (1.727 m)   Wt 262 lb (118.8 kg)   SpO2 97%   BMI 39.84 kg/m²     Body mass index is 39.84 kg/m². Nursing note and vitals reviewed.     Physical Exam  Constitutional: Well-developed, well-nourished, and in no distress. Obese. Cardiovascular: Normal rate, regular rhythm, normal heart sounds and intact distal pulses. Exam reveals no gallop and no friction rub. No murmur heard. Pulmonary/Chest: Effort normal and breath sounds normal. No respiratory distress. No wheezes, no rales, no tenderness. Abdominal: Soft. Bowel sounds are normal. No distension and no mass. There is no tenderness. There is no rebound and no guarding. Anogenital: No visible lesions, erythema, bleeding, secretions. Skin: Skin is warm and dry. No rash noted. Not diaphoretic. No erythema. No pallor. Assessment / Plan   Mr. Ace Zaragoza is a 57 y.o. male with the following medical condition(s):    1. Pinworms  Resolved. S/P treatment with Pyrantel Pamoate which is the recommended medication for pinworms. Patient recommended to follow up as needed. Follow-up Disposition:  Return if symptoms worsen or fail to improve. · I have discussed the diagnosis with the patient and the intended plan as seen in the above orders. The patient has received an after-visit summary and questions were answered concerning future plans. I have discussed medication side effects and warnings with the patient as well.       Patient/Plan discussed with Dr. Quincy Howard (Attending Physician)      Aldair Maher MD  PGY-3 Family Medicine Resident  Encounter Date: 10/24/2018

## 2018-10-24 NOTE — PATIENT INSTRUCTIONS
Pinworms: Care Instructions  Your Care Instructions  A pinworm is a type of parasite that lives in the lower digestive system of humans. Pinworms survive by getting nutrients from the food you eat. You are most likely to get pinworms by swallowing their eggs. This happens when someone with pinworms scratches around the anus, gets eggs on his or her hands (or under the fingernails), and touches you or an object that you later touch. Many people feel embarrassed about having \"worms. \" Pinworm infections can happen to anyone, are spread very easily, and are not related to being unclean. They are especially common in children. They are also easily treated. If you or someone in your family has pinworms that keep coming back, or if more than one family member is infected, every member of your family or household should be treated. Follow-up care is a key part of your treatment and safety. Be sure to make and go to all appointments, and call your doctor if you are having problems. It's also a good idea to know your test results and keep a list of the medicines you take. How can you care for yourself at home? · Take your medicine exactly as prescribed. Call your doctor if you have any problems with your medicine. · Wash your hands well and often. · Cut your fingernails short, and keep them trimmed. This can prevent eggs from sticking under your nails. · Wash all clothes, towels, and bedding. Do this often, and especially on the first day after treatment. Dry them in a heated dryer. · Do not scratch. Itching around the anus caused by a pinworm infection usually happens at night. Try wearing gloves, pajamas, and close-fitting clothing to help prevent scratching. · Bathe carefully every day. Be sure to clean the skin around the anus. This will remove pinworm eggs. Showers may be better than baths because you have less chance of getting water that has pinworm eggs into your mouth.   · Do not fan or fluff the bedding of a person with pinworms. Doing this can release pinworm eggs into the air. You can swallow eggs that are in the air when you breathe through your mouth. When should you call for help? Call your doctor now or seek immediate medical care if:    · You develop other symptoms, including:  ? A fever or belly pain. ? Redness, tenderness, or swelling in the genital area. ? Itching in the genital area or vagina. ? Pain when you urinate. ? A frequent or urgent need to urinate. ? Lack of control of urination.    Watch closely for changes in your health, and be sure to contact your doctor if:    · Your doctor gave you medicine, and the pinworms have not cleared up as expected (usually within 4 to 6 weeks).     · You are having side effects from medicine for pinworms. Where can you learn more? Go to http://jordan-jessee.info/. Enter 954 02 903 in the search box to learn more about \"Pinworms: Care Instructions. \"  Current as of: March 28, 2018  Content Version: 11.8  © 9889-2584 [employer]. Care instructions adapted under license by Hum (which disclaims liability or warranty for this information). If you have questions about a medical condition or this instruction, always ask your healthcare professional. [address] any warranty or liability for your use of this information.

## 2019-07-22 ENCOUNTER — OFFICE VISIT (OUTPATIENT)
Dept: FAMILY MEDICINE CLINIC | Age: 49
End: 2019-07-22

## 2019-07-22 VITALS
OXYGEN SATURATION: 99 % | HEART RATE: 66 BPM | DIASTOLIC BLOOD PRESSURE: 80 MMHG | SYSTOLIC BLOOD PRESSURE: 126 MMHG | BODY MASS INDEX: 39.1 KG/M2 | RESPIRATION RATE: 19 BRPM | WEIGHT: 258 LBS | HEIGHT: 68 IN | TEMPERATURE: 98.9 F

## 2019-07-22 DIAGNOSIS — R10.13 EPIGASTRIC PAIN: Primary | ICD-10-CM

## 2019-07-22 RX ORDER — RANITIDINE 150 MG/1
150 TABLET, FILM COATED ORAL 2 TIMES DAILY
Qty: 60 TAB | Refills: 0 | Status: SHIPPED | OUTPATIENT
Start: 2019-07-22 | End: 2019-08-21

## 2019-07-22 NOTE — PROGRESS NOTES
Progress Note    Patient: Leslie Chicas MRN: [id number]  SSN: [id number]    YOB: 1970  Age: 54 y.o. Sex: male        Chief Complaint   Patient presents with    Epigastric Pain     X days; sharp pain. Dark loose stool     Subjective:     Problems addressed:  Encounter Diagnoses     ICD-10-CM ICD-9-CM   1. Epigastric pain R10.13 789.06   Pt is a 55 y/o M with hx of Gastric sleeve who presents with epigastric pain. Onset: 4 days. Pain is sharp, non radiating, resolved with bowel movement, worsens with PO intake (water or food). Pt states that he is having bowel movements with every PO intake. Treatment: pepto bismol helped with pain slightly. Patient endorses eating a lot of spicy food and drinking coffee. No constipation, diarrhea, vomiting or nausea. Surgical Hx: Gastric sleeve 5 years ago. No surgical complications per patient. Pt has no abd pain right now. Current and past medical information:    Current Medications after this visit[de-identified]     Current Outpatient Medications   Medication Sig    escitalopram oxalate (LEXAPRO) 10 mg tablet Take 1 Tab by mouth daily.  ibuprofen (MOTRIN) 800 mg tablet Take 1 Tab by mouth every eight (8) hours as needed for Pain.  cyclobenzaprine (FLEXERIL) 5 mg tablet Take 1 Tab by mouth three (3) times daily as needed for Muscle Spasm(s). No current facility-administered medications for this visit.         Patient Active Problem List    Diagnosis Date Noted    Soft tissue tumor 01/15/2016    External hemorrhoid 01/12/2015    Eczema 12/27/2013    Lipid disorder 01/26/2013    AN (acanthosis nigricans) 06/11/2012       Past Medical History:   Diagnosis Date    Contact dermatitis and other eczema, due to unspecified cause     Headache(784.0)        No Known Allergies    Past Surgical History:   Procedure Laterality Date    ESWL FOR GALLSTONES      HX LAP GASTRIC BYPASS  March 2014    VS. Sleeve gastrectomy       Social History Socioeconomic History    Marital status: UNKNOWN     Spouse name: Not on file    Number of children: Not on file    Years of education: Not on file    Highest education level: Not on file   Tobacco Use    Smoking status: Never Smoker    Smokeless tobacco: Never Used   Substance and Sexual Activity    Alcohol use: No    Drug use: No    Sexual activity: Yes     Partners: Female     Birth control/protection: None         Review of Systems   Constitutional: Negative for chills and fever. Respiratory: Negative for cough and shortness of breath. Cardiovascular: Negative for chest pain and palpitations. Gastrointestinal: Positive for abdominal pain. Negative for blood in stool, constipation, diarrhea, melena, nausea and vomiting. Genitourinary: Negative for dysuria and urgency. Neurological: Negative for dizziness and headaches. Objective:     Vitals:    07/22/19 1605 07/22/19 1637   BP: 144/75 126/80   Pulse: 66    Resp: 19    Temp: 98.9 °F (37.2 °C)    SpO2: 99%    Weight: 258 lb (117 kg)    Height: 5' 8\" (1.727 m)       Body mass index is 39.23 kg/m². Physical Exam   Constitutional: He appears well-developed and well-nourished. No distress. Cardiovascular: Normal rate, regular rhythm and intact distal pulses. Pulmonary/Chest: Effort normal and breath sounds normal. No respiratory distress. He has no wheezes. He has no rales. He exhibits no tenderness. Abdominal: Soft. Bowel sounds are normal. He exhibits no distension and no mass. There is no guarding. positive bowel sounds. Slight tenderness in the epigastric region. No guarding present   Skin: He is not diaphoretic. There are no preventive care reminders to display for this patient. Pt is a 55 y/o M with hx of Gastric sleeve who presents with epigastric pain. Assessment and orders:     Encounter Diagnoses     ICD-10-CM ICD-9-CM   1. Epigastric pain R10.13 789.06     1. Epigastric pain: possibly 2/2 reflux.  Patient is obese and eats a lot of spicy food. Benign abdominal exam and clinic today with only tenderness in the epigastric region. Late gastric sleeve complication is also possible. We'll treat with trial of Zantac. Advised patient to contact previous surgeon if abdominal pain does not improve. Dark stools likely secondary to Pepto-Bismol.   - raNITIdine (ZANTAC) 150 mg tablet; Take 1 Tab by mouth two (2) times a day for 30 days. - Discussed strict ER precautions the patient: advised patient to go to ED if abdominal pain worsens or if he develops any nausea or vomiting.  - follow up in clinic in 2 weeks for complete physical exam and labs    Plan of care:  Discussed diagnoses in detail with patient. Medication risks/benefits/side effects discussed with patient. All of the patient's questions were addressed. The patient understands and agrees with our plan of care. The patient knows to call back if they are unsure of or forget any changes we discussed today or if the symptoms change. The patient received an After-Visit Summary which contains VS, orders, medication list and allergy list. This can be used as a \"mini-medical record\" should they have to seek medical care while out of town. Signed By: Philip Zeng MD     July 22, 2019      Pt discussed with Dr. Beto Stoddard.

## 2019-07-22 NOTE — PROGRESS NOTES
Chief Complaint   Patient presents with    Epigastric Pain     X days; sharp pain.  Dark loose stool

## 2019-08-21 ENCOUNTER — OFFICE VISIT (OUTPATIENT)
Dept: FAMILY MEDICINE CLINIC | Age: 49
End: 2019-08-21

## 2019-08-21 VITALS
HEIGHT: 68 IN | DIASTOLIC BLOOD PRESSURE: 77 MMHG | BODY MASS INDEX: 40.01 KG/M2 | TEMPERATURE: 97.8 F | OXYGEN SATURATION: 100 % | RESPIRATION RATE: 16 BRPM | HEART RATE: 56 BPM | WEIGHT: 264 LBS | SYSTOLIC BLOOD PRESSURE: 126 MMHG

## 2019-08-21 DIAGNOSIS — Z00.00 WELL ADULT EXAM: Primary | ICD-10-CM

## 2019-08-21 DIAGNOSIS — E78.9 LIPID DISORDER: ICD-10-CM

## 2019-08-21 NOTE — PROGRESS NOTES
Jesus Roblero is an 54 y.o. male who presents for well male exam and for routine labs. Doing well. No complaints/ concerns. Epigastric pain has resolved with zantac. Sexually active: yes with wife    Diet: cut down on sodas (was drinking 9-10 sodas a day and snacking previously), cut down on rice and tortillas. Eating more protein. However, does eat a lot of fried food. Exercise: Gets exercise at work ( for [employer]). Does not exercise other than work. Pt would like to have routine labs done today along with HIV. Would also like to have his thyroid checked. Denies any thyroid symptoms and denies family history of thyroid problems. Allergies- reviewed:   No Known Allergies      Medications- reviewed:   Current Outpatient Medications   Medication Sig    aspirin/acetaminophen/caffeine (EXCEDRIN EXTRA STRENGTH PO) Take  by mouth.  raNITIdine (ZANTAC) 150 mg tablet Take 1 Tab by mouth two (2) times a day for 30 days.  escitalopram oxalate (LEXAPRO) 10 mg tablet Take 1 Tab by mouth daily.  ibuprofen (MOTRIN) 800 mg tablet Take 1 Tab by mouth every eight (8) hours as needed for Pain.  cyclobenzaprine (FLEXERIL) 5 mg tablet Take 1 Tab by mouth three (3) times daily as needed for Muscle Spasm(s). No current facility-administered medications for this visit.           Past Medical History- reviewed:  Past Medical History:   Diagnosis Date    Contact dermatitis and other eczema, due to unspecified cause     Headache(784.0)          Past Surgical History- reviewed:   Past Surgical History:   Procedure Laterality Date    ESWL FOR GALLSTONES      HX LAP GASTRIC BYPASS  March 2014    VS. Sleeve gastrectomy         Family History - reviewed:  Family History   Problem Relation Age of Onset    Heart Disease Father     Hypertension Father          Social History - reviewed:  Social History     Socioeconomic History    Marital status: UNKNOWN     Spouse name: Not on file  Number of children: Not on file    Years of education: Not on file    Highest education level: Not on file   Occupational History    Not on file   Social Needs    Financial resource strain: Not on file    Food insecurity:     Worry: Not on file     Inability: Not on file    Transportation needs:     Medical: Not on file     Non-medical: Not on file   Tobacco Use    Smoking status: Never Smoker    Smokeless tobacco: Never Used   Substance and Sexual Activity    Alcohol use: No    Drug use: No    Sexual activity: Yes     Partners: Female     Birth control/protection: None   Lifestyle    Physical activity:     Days per week: Not on file     Minutes per session: Not on file    Stress: Not on file   Relationships    Social connections:     Talks on phone: Not on file     Gets together: Not on file     Attends Latter day service: Not on file     Active member of club or organization: Not on file     Attends meetings of clubs or organizations: Not on file     Relationship status: Not on file    Intimate partner violence:     Fear of current or ex partner: Not on file     Emotionally abused: Not on file     Physically abused: Not on file     Forced sexual activity: Not on file   Other Topics Concern    Not on file   Social History Narrative    Not on file       Immunizations - reviewed:   Immunization History   Administered Date(s) Administered    Influenza Vaccine 09/26/2013    Influenza Vaccine (Quad) PF 01/04/2016    Influenza Vaccine PF 01/12/2015    Tdap 01/12/2015     Health Maintenance reviewed -   HIV testing: would like today.      Review of Systems   CONSTITUTIONAL: Denies: fever, chills, weakness  EYES: Denies: blurry vision, decreased vision  ENT: Denies: sore throat, nasal congestion  CARDIOVASCULAR: Denies: chest pain, dyspnea on exertion  RESPIRATORY: Denies: cough, shortness of breath  ENDOCRINE: Denies: polydipsia/polyuria, palpitations, skin changes  GI: Denies: abdominal pain, flank pain, nausea, vomiting, diarrhea, constipation  : Denies: dysuria, frequency/urgency  NEURO: Denies: dizzy/vertigo  MUSCULOSKELETAL: Denies: back pain, joint pain  SKIN: Denies: rash  PSYCH: Denies: anxiety, depression    Objective:     Visit Vitals  /77 (BP 1 Location: Left arm, BP Patient Position: Sitting)   Pulse (!) 56   Temp 97.8 °F (36.6 °C) (Oral)   Resp 16   Ht 5' 8\" (1.727 m)   Wt 264 lb (119.7 kg)   SpO2 100%   BMI 40.14 kg/m²     General appearance - alert, well appearing, and in no distress. Obese. Eyes - pupils equal and reactive, extraocular eye movements intact  Ears - bilateral TM's and external ear canals normal  Nose - normal and patent, no erythema, discharge or polyps  Mouth - mucous membranes moist, pharynx normal without lesions  Neck - supple, no significant adenopathy  Chest - clear to auscultation, no wheezes, rales or rhonchi, symmetric air entry  Heart - justo, regular rhythm, normal S1, S2, no murmurs, rubs, clicks or gallops  Abdomen - soft, nontender, nondistended, no masses or organomegaly  Neurological - alert, oriented, normal speech, no focal findings or movement disorder noted  Musculoskeletal - no joint tenderness, deformity or swelling  Extremities - peripheral pulses normal, no pedal edema, no clubbing or cyanosis  Skin - normal coloration and turgor, no rashes, no suspicious skin lesions noted    Assessment:       ICD-10-CM ICD-9-CM    1. Well adult exam Z00.00 V70.0 CBC W/O DIFF      TSH AND FREE T4      HIV 1/2 AG/AB, 4TH GENERATION,W RFLX CONFIRM   2. Lipid disorder E98.3 531.0 METABOLIC PANEL, COMPREHENSIVE      LIPID PANEL   3. BMI 40.0-44.9, adult ([facility] [address] Z68.41 V85.41 HEMOGLOBIN A1C WITH EAG       Plan:   1. Well adult exam: Doing well no concerns today. - Discussed at length about: diet, exercise, healthy lifestyle  - Appropriate labs, vaccines, imaging studies, and referrals ordered as listed above  - Discussed the patient's BMI with him.   The BMI follow up plan is as follows: I have counseled this patient on diet and exercise regimens. - f/u in 1 year    2. Lipid disorder: LDL: 173 (8/2017). - Lipid panel today, will determine ASCVD risk after labs today. - discussed lifestyle changes as stated above. 3. BMI 40:   Discussed importance of diet and exercise. Emphasized plant diet and increasing fruit and vegetable intake to 5-10 servings a day, drink 8-10 glasses of water a day(especially with meals), avoid sodas, fried foods, fast food. Encouraged to increase workout to 150 mins. I have discussed the diagnosis with the patient and the intended plan as seen in the above orders. The patient has received an after-visit summary and questions were answered concerning future plans. I have discussed medication side effects and warnings with the patient as well. Informed pt to return to the office if new symptoms arise. Wilver Rodriguez MD  Family Medicine Resident  PGY-3    Patient discussed with Dr. Chanel Page, attending physician.

## 2019-08-21 NOTE — PROGRESS NOTES
Chief Complaint   Patient presents with    Complete Physical     1. Have you been to the ER, urgent care clinic since your last visit? Hospitalized since your last visit? No    2. Have you seen or consulted any other health care providers outside of the [address] [place] [address] since your last visit? Include any pap smears or colon screening.  No     Health Maintenance Due   Topic Date Due    Influenza Age 5 to Adult  08/01/2019     Visit Vitals  /77 (BP 1 Location: Left arm, BP Patient Position: Sitting)   Pulse (!) 50   Temp 97.8 °F (36.6 °C) (Oral)   Resp 16   Ht 5' 8\" (1.727 m)   Wt 264 lb (119.7 kg)   SpO2 100%   BMI 40.14 kg/m²

## 2019-08-21 NOTE — PATIENT INSTRUCTIONS
Well Visit, Ages 25 to 48: Care Instructions  Your Care Instructions    Physical exams can help you stay healthy. Your doctor has checked your overall health and may have suggested ways to take good care of yourself. He or she also may have recommended tests. At home, you can help prevent illness with healthy eating, regular exercise, and other steps. Follow-up care is a key part of your treatment and safety. Be sure to make and go to all appointments, and call your doctor if you are having problems. It's also a good idea to know your test results and keep a list of the medicines you take. How can you care for yourself at home? · Reach and stay at a healthy weight. This will lower your risk for many problems, such as obesity, diabetes, heart disease, and high blood pressure. · Get at least 30 minutes of physical activity on most days of the week. Walking is a good choice. You also may want to do other activities, such as running, swimming, cycling, or playing tennis or team sports. Discuss any changes in your exercise program with your doctor. · Do not smoke or allow others to smoke around you. If you need help quitting, talk to your doctor about stop-smoking programs and medicines. These can increase your chances of quitting for good. · Talk to your doctor about whether you have any risk factors for sexually transmitted infections (STIs). Having one sex partner (who does not have STIs and does not have sex with anyone else) is a good way to avoid these infections. · Use birth control if you do not want to have children at this time. Talk with your doctor about the choices available and what might be best for you. · Protect your skin from too much sun. When you're outdoors from 10 a.m. to 4 p.m., stay in the shade or cover up with clothing and a hat with a wide brim. Wear sunglasses that block UV rays. Even when it's cloudy, put broad-spectrum sunscreen (SPF 30 or higher) on any exposed skin.   · See a dentist one or two times a year for checkups and to have your teeth cleaned. · Wear a seat belt in the car. Follow your doctor's advice about when to have certain tests. These tests can spot problems early. For everyone  · Cholesterol. Have the fat (cholesterol) in your blood tested after age 21. Your doctor will tell you how often to have this done based on your age, family history, or other things that can increase your risk for heart disease. · Blood pressure. Have your blood pressure checked during a routine doctor visit. Your doctor will tell you how often to check your blood pressure based on your age, your blood pressure results, and other factors. · Vision. Talk with your doctor about how often to have a glaucoma test.  · Diabetes. Ask your doctor whether you should have tests for diabetes. · Colon cancer. Your risk for colorectal cancer gets higher as you get older. Some experts say that adults should start regular screening at age 48 and stop at age 76. Others say to start before age 48 or continue after age 76. Talk with your doctor about your risk and when to start and stop screening. For women  · Breast exam and mammogram. Talk to your doctor about when you should have a clinical breast exam and a mammogram. Medical experts differ on whether and how often women under 50 should have these tests. Your doctor can help you decide what is right for you. · Cervical cancer screening test and pelvic exam. Begin with a Pap test at age 24. The test often is part of a pelvic exam. Starting at age 27, you may choose to have a Pap test, an HPV test, or both tests at the same time (called co-testing). Talk with your doctor about how often to have testing. · Tests for sexually transmitted infections (STIs). Ask whether you should have tests for STIs. You may be at risk if you have sex with more than one person, especially if your partners do not wear condoms.   For men  · Tests for sexually transmitted infections (STIs). Ask whether you should have tests for STIs. You may be at risk if you have sex with more than one person, especially if you do not wear a condom. · Testicular cancer exam. Ask your doctor whether you should check your testicles regularly. · Prostate exam. Talk to your doctor about whether you should have a blood test (called a PSA test) for prostate cancer. Experts differ on whether and when men should have this test. Some experts suggest it if you are older than 39 and are -American or have a father or brother who got prostate cancer when he was younger than 72. When should you call for help? Watch closely for changes in your health, and be sure to contact your doctor if you have any problems or symptoms that concern you. Where can you learn more? Go to http://jordan-jessee.info/. Enter P072 in the search box to learn more about \"Well Visit, Ages 25 to 48: Care Instructions. \"  Current as of: December 13, 2018  Content Version: 12.1  © 5374-6906 [employer]. Care instructions adapted under license by [employer] (which disclaims liability or warranty for this information). If you have questions about a medical condition or this instruction, always ask your healthcare professional. [place] [address] any warranty or liability for your use of this information.

## 2019-08-22 LAB
ALBUMIN SERPL-MCNC: 4.1 G/DL (ref 3.5–5.5)
ALBUMIN/GLOB SERPL: 1.4 {RATIO} (ref 1.2–2.2)
ALP SERPL-CCNC: 43 IU/L (ref 39–117)
ALT SERPL-CCNC: 17 IU/L (ref 0–44)
AST SERPL-CCNC: 14 IU/L (ref 0–40)
BILIRUB SERPL-MCNC: 0.4 MG/DL (ref 0–1.2)
BUN SERPL-MCNC: 11 MG/DL (ref 6–24)
BUN/CREAT SERPL: 12 (ref 9–20)
CALCIUM SERPL-MCNC: 9.4 MG/DL (ref 8.7–10.2)
CHLORIDE SERPL-SCNC: 104 MMOL/L (ref 96–106)
CHOLEST SERPL-MCNC: 258 MG/DL (ref 100–199)
CO2 SERPL-SCNC: 24 MMOL/L (ref 20–29)
CREAT SERPL-MCNC: 0.92 MG/DL (ref 0.76–1.27)
ERYTHROCYTE [DISTWIDTH] IN BLOOD BY AUTOMATED COUNT: 13.8 % (ref 12.3–15.4)
EST. AVERAGE GLUCOSE BLD GHB EST-MCNC: 120 MG/DL
GLOBULIN SER CALC-MCNC: 2.9 G/DL (ref 1.5–4.5)
GLUCOSE SERPL-MCNC: 103 MG/DL (ref 65–99)
HBA1C MFR BLD: 5.8 % (ref 4.8–5.6)
HCT VFR BLD AUTO: 41.3 % (ref 37.5–51)
HDLC SERPL-MCNC: 46 MG/DL
HGB BLD-MCNC: 13.5 G/DL (ref 13–17.7)
HIV 1+2 AB+HIV1 P24 AG SERPL QL IA: NON REACTIVE
INTERPRETATION, 910389: NORMAL
LDLC SERPL CALC-MCNC: 185 MG/DL (ref 0–99)
MCH RBC QN AUTO: 28.9 PG (ref 26.6–33)
MCHC RBC AUTO-ENTMCNC: 32.7 G/DL (ref 31.5–35.7)
MCV RBC AUTO: 88 FL (ref 79–97)
PLATELET # BLD AUTO: 249 X10E3/UL (ref 150–450)
POTASSIUM SERPL-SCNC: 4.7 MMOL/L (ref 3.5–5.2)
PROT SERPL-MCNC: 7 G/DL (ref 6–8.5)
RBC # BLD AUTO: 4.67 X10E6/UL (ref 4.14–5.8)
SODIUM SERPL-SCNC: 144 MMOL/L (ref 134–144)
T4 FREE SERPL-MCNC: 1.16 NG/DL (ref 0.82–1.77)
TRIGL SERPL-MCNC: 134 MG/DL (ref 0–149)
TSH SERPL DL<=0.005 MIU/L-ACNC: 2.51 UIU/ML (ref 0.45–4.5)
VLDLC SERPL CALC-MCNC: 27 MG/DL (ref 5–40)
WBC # BLD AUTO: 7.4 X10E3/UL (ref 3.4–10.8)

## 2019-09-17 NOTE — PROGRESS NOTES
1. ASCVD risk: 4%, Prediabetes: No indication to be on statin. Recommend lifestyle modifications. 2. TSH wnl and HIV neg. Results sent via mail.

## 2022-05-20 ENCOUNTER — HOSPITAL ENCOUNTER (EMERGENCY)
Age: 52
Discharge: HOME | End: 2022-05-21
Attending: EMERGENCY MEDICINE
Payer: COMMERCIAL

## 2022-05-20 ENCOUNTER — APPOINTMENT (OUTPATIENT)
Dept: CT IMAGING | Age: 52
End: 2022-05-20
Attending: EMERGENCY MEDICINE
Payer: COMMERCIAL

## 2022-05-20 DIAGNOSIS — G51.0 BELL'S PALSY: Primary | ICD-10-CM

## 2022-05-20 DIAGNOSIS — M54.2 MUSCULOSKELETAL NECK PAIN: ICD-10-CM

## 2022-05-20 PROCEDURE — 96375 TX/PRO/DX INJ NEW DRUG ADDON: CPT

## 2022-05-20 PROCEDURE — 74011250637 HC RX REV CODE- 250/637: Performed by: EMERGENCY MEDICINE

## 2022-05-20 PROCEDURE — 99284 EMERGENCY DEPT VISIT MOD MDM: CPT

## 2022-05-20 PROCEDURE — 96374 THER/PROPH/DIAG INJ IV PUSH: CPT

## 2022-05-20 PROCEDURE — 74011250636 HC RX REV CODE- 250/636: Performed by: EMERGENCY MEDICINE

## 2022-05-20 PROCEDURE — 70450 CT HEAD/BRAIN W/O DYE: CPT

## 2022-05-20 RX ORDER — METOCLOPRAMIDE 10 MG/1
10 TABLET ORAL
Status: COMPLETED | OUTPATIENT
Start: 2022-05-20 | End: 2022-05-20

## 2022-05-20 RX ORDER — DIPHENHYDRAMINE HYDROCHLORIDE 50 MG/ML
25 INJECTION, SOLUTION INTRAMUSCULAR; INTRAVENOUS
Status: COMPLETED | OUTPATIENT
Start: 2022-05-20 | End: 2022-05-20

## 2022-05-20 RX ORDER — KETOROLAC TROMETHAMINE 30 MG/ML
30 INJECTION, SOLUTION INTRAMUSCULAR; INTRAVENOUS ONCE
Status: COMPLETED | OUTPATIENT
Start: 2022-05-20 | End: 2022-05-20

## 2022-05-20 RX ADMIN — DIPHENHYDRAMINE HYDROCHLORIDE 25 MG: 50 INJECTION, SOLUTION INTRAMUSCULAR; INTRAVENOUS at 23:58

## 2022-05-20 RX ADMIN — KETOROLAC TROMETHAMINE 30 MG: 30 INJECTION, SOLUTION INTRAMUSCULAR at 23:58

## 2022-05-20 RX ADMIN — SODIUM CHLORIDE 1000 ML: 9 INJECTION, SOLUTION INTRAVENOUS at 23:59

## 2022-05-20 RX ADMIN — METOCLOPRAMIDE 10 MG: 10 TABLET ORAL at 23:58

## 2022-05-21 VITALS
SYSTOLIC BLOOD PRESSURE: 140 MMHG | RESPIRATION RATE: 16 BRPM | TEMPERATURE: 99.1 F | OXYGEN SATURATION: 99 % | HEART RATE: 84 BPM | HEIGHT: 67 IN | BODY MASS INDEX: 42.28 KG/M2 | DIASTOLIC BLOOD PRESSURE: 87 MMHG | WEIGHT: 269.4 LBS

## 2022-05-21 PROCEDURE — 74011000250 HC RX REV CODE- 250: Performed by: EMERGENCY MEDICINE

## 2022-05-21 PROCEDURE — 74011250637 HC RX REV CODE- 250/637: Performed by: EMERGENCY MEDICINE

## 2022-05-21 PROCEDURE — 74011636637 HC RX REV CODE- 636/637: Performed by: EMERGENCY MEDICINE

## 2022-05-21 RX ORDER — PREDNISONE 20 MG/1
60 TABLET ORAL
Status: COMPLETED | OUTPATIENT
Start: 2022-05-21 | End: 2022-05-21

## 2022-05-21 RX ORDER — LIDOCAINE 4 G/100G
1 PATCH TOPICAL
Status: DISCONTINUED | OUTPATIENT
Start: 2022-05-21 | End: 2022-05-21 | Stop reason: HOSPADM

## 2022-05-21 RX ORDER — VALACYCLOVIR HYDROCHLORIDE 1 G/1
1000 TABLET, FILM COATED ORAL 3 TIMES DAILY
Qty: 21 TABLET | Refills: 0 | Status: SHIPPED | OUTPATIENT
Start: 2022-05-21 | End: 2022-05-28

## 2022-05-21 RX ORDER — PREDNISONE 20 MG/1
60 TABLET ORAL DAILY
Qty: 21 TABLET | Refills: 0 | Status: SHIPPED | OUTPATIENT
Start: 2022-05-21 | End: 2022-05-28

## 2022-05-21 RX ORDER — CYCLOBENZAPRINE HCL 10 MG
10 TABLET ORAL
Qty: 15 TABLET | Refills: 0 | Status: SHIPPED | OUTPATIENT
Start: 2022-05-21 | End: 2022-05-26

## 2022-05-21 RX ORDER — VALACYCLOVIR HYDROCHLORIDE 500 MG/1
1000 TABLET, FILM COATED ORAL
Status: COMPLETED | OUTPATIENT
Start: 2022-05-21 | End: 2022-05-21

## 2022-05-21 RX ADMIN — VALACYCLOVIR HYDROCHLORIDE 1000 MG: 500 TABLET, FILM COATED ORAL at 02:26

## 2022-05-21 RX ADMIN — PREDNISONE 60 MG: 20 TABLET ORAL at 02:18

## 2022-05-21 NOTE — ED NOTES
Patient is discharged home today. Reviewed ED guideline instructions for care and medications with patient and family. Teaching provided on beneficial effects of rest, especially during the acute phase of  illness. Signs of complications or worsening condition to report. Importance of taking all prescribed medication doses as scheduled. Patient verbalized understanding. Discharged via ambulation accompanied by spouse. Transported via private car.

## 2022-05-21 NOTE — DISCHARGE INSTRUCTIONS
Please take the steroids and valcyclovir as prescribed. You can use the flexeril for neck pain as needed. Please follow up with neurology next week. Thank you.

## 2022-05-21 NOTE — ED TRIAGE NOTES
Patient reports left side numbness, eye twitching, left side neck pain sharp. Reports numbness to the left side of the tongue. Reports all symptoms started 5 days ago. Yesterday felt dizzy, had HA and it went away after 3 hours. Patient is ambulatory in Triage. Denies other symptoms.

## 2022-05-21 NOTE — ED PROVIDER NOTES
Pt is a 49 yo male with hx of headaches who p/w 4 day hx of left sided facial numbness, inability to close left eye, and lack of taste to part of his tongue. Pt says he has a hx of frontal headaches but he has a new pain on his left neck. Sharp stabbing intermittent pain, no relief with tylenol. He has not been able to sleep over the last 4 days. Denies any weakness, vision changes. Says the last few days, while he is in the shower, water keeps getting into his left eye. On exam, obvious left sided facial droop noted. No F/C/N/V. Past Medical History:   Diagnosis Date    Contact dermatitis and other eczema, due to unspecified cause     Headache(784.0)        Past Surgical History:   Procedure Laterality Date    ESWL FOR GALLSTONES      HX LAP GASTRIC BYPASS  March 2014    [facility]. Sleeve gastrectomy         Family History:   Problem Relation Age of Onset    Heart Disease Father     Hypertension Father        Social History     Socioeconomic History    Marital status: UNKNOWN     Spouse name: Not on file    Number of children: Not on file    Years of education: Not on file    Highest education level: Not on file   Occupational History    Not on file   Tobacco Use    Smoking status: Never Smoker    Smokeless tobacco: Never Used   Substance and Sexual Activity    Alcohol use: No    Drug use: No    Sexual activity: Yes     Partners: Female     Birth control/protection: None   Other Topics Concern    Not on file   Social History Narrative    Not on file     Social Determinants of Health     Financial Resource Strain:     Difficulty of Paying Living Expenses: Not on file   Food Insecurity:     Worried About Running Out of Food in the Last Year: Not on file    [place] of Food in the Last Year: Not on file   Transportation Needs:     Lack of Transportation (Medical): Not on file    Lack of Transportation (Non-Medical):  Not on file   Physical Activity:     Days of Exercise per Week: Not on file   [employer] of Exercise per Session: Not on file   Stress:     Feeling of Stress : Not on file   Social Connections:     Frequency of Communication with Friends and Family: Not on file    Frequency of Social Gatherings with Friends and Family: Not on file    Attends Taoism Services: Not on file    Active Member of Clubs or Organizations: Not on file    Attends Club or Organization Meetings: Not on file    Marital Status: Not on file   Intimate Partner Violence:     Fear of Current or Ex-Partner: Not on file    Emotionally Abused: Not on file    Physically Abused: Not on file    Sexually Abused: Not on file   Housing Stability:     Unable to Pay for Housing in the Last Year: Not on file    Number of [place] in the Last Year: Not on file    Unstable Housing in the Last Year: Not on file         ALLERGIES: Patient has no known allergies. Review of Systems   Constitutional: Negative for chills and fever. HENT: Negative for drooling and nosebleeds. Eyes: Negative for pain, itching and visual disturbance. Respiratory: Negative for choking and stridor. Cardiovascular: Negative for leg swelling. Gastrointestinal: Negative for abdominal pain and rectal pain. Endocrine: Negative for heat intolerance and polyphagia. Genitourinary: Negative for enuresis and genital sores. Musculoskeletal: Negative for arthralgias and joint swelling. Skin: Negative for color change. Allergic/Immunologic: Negative for immunocompromised state. Neurological: Positive for weakness, numbness and headaches. Negative for tremors and speech difficulty. Hematological: Negative for adenopathy. Psychiatric/Behavioral: Negative for dysphoric mood and sleep disturbance. Vitals:    05/20/22 2301   BP: (!) 140/87   Pulse: 84   Resp: 16   Temp: 99.1 °F (37.3 °C)   SpO2: 98%   Weight: 122.2 kg (269 lb 6.4 oz)   Height: 5' 7\" (1.702 m)            Physical Exam  Vitals and nursing note reviewed. Constitutional:       General: He is not in acute distress. Appearance: He is well-developed. He is not ill-appearing, toxic-appearing or diaphoretic. HENT:      Head: Normocephalic. Nose: Nose normal.   Eyes:      Conjunctiva/sclera: Conjunctivae normal.   Neck:      Comments: TTP along left lateral neck. No meningismus. Cardiovascular:      Rate and Rhythm: Normal rate and regular rhythm. Heart sounds: Normal heart sounds. Pulmonary:      Effort: Pulmonary effort is normal. No respiratory distress. Abdominal:      General: There is no distension. Palpations: Abdomen is soft. Musculoskeletal:         General: No deformity. Normal range of motion. Cervical back: Normal range of motion and neck supple. Tenderness present. Skin:     General: Skin is warm and dry. Neurological:      Mental Status: He is alert. Cranial Nerves: Cranial nerve deficit present. Coordination: Coordination normal.      Comments: Left facial droop with no forehead sparing. Psychiatric:         Behavior: Behavior normal.          MDM  Number of Diagnoses or Management Options  Bell's palsy  Musculoskeletal neck pain  Diagnosis management comments: On reevaluation, patient reports his headache is completely resolved. He says he occasionally has sharp neck pain with head movement but is pain-free at this time. We will follow-up with neurology next week for further management. No concern for CVA today or any other pathology warranting further imaging. Is stable for discharge. Procedures    Patient's results have been reviewed with them. Patient and/or family have verbally conveyed their understanding and agreement of the patient's signs, symptoms, diagnosis, treatment and prognosis and additionally agree to follow up as recommended or return to the Emergency Room should their condition change prior to follow-up.   Discharge instructions have also been provided to the patient with some educational information regarding their diagnosis as well a list of reasons why they would want to return to the ER prior to their follow-up appointment should their condition change.

## 2023-05-20 RX ORDER — IBUPROFEN 800 MG/1
800 TABLET ORAL EVERY 8 HOURS PRN
COMMUNITY
Start: 2017-07-26

## 2023-05-20 RX ORDER — ESCITALOPRAM OXALATE 10 MG/1
10 TABLET ORAL DAILY
COMMUNITY
Start: 2017-08-25